# Patient Record
Sex: FEMALE | Race: WHITE
[De-identification: names, ages, dates, MRNs, and addresses within clinical notes are randomized per-mention and may not be internally consistent; named-entity substitution may affect disease eponyms.]

---

## 2023-07-28 ENCOUNTER — HOSPITAL ENCOUNTER (EMERGENCY)
Dept: HOSPITAL 47 - EC | Age: 53
Discharge: HOME | End: 2023-07-28
Payer: COMMERCIAL

## 2023-07-28 VITALS
RESPIRATION RATE: 16 BRPM | SYSTOLIC BLOOD PRESSURE: 94 MMHG | TEMPERATURE: 97.9 F | DIASTOLIC BLOOD PRESSURE: 66 MMHG | HEART RATE: 87 BPM

## 2023-07-28 DIAGNOSIS — Z88.5: ICD-10-CM

## 2023-07-28 DIAGNOSIS — Z88.0: ICD-10-CM

## 2023-07-28 DIAGNOSIS — Z79.899: ICD-10-CM

## 2023-07-28 DIAGNOSIS — Z87.891: ICD-10-CM

## 2023-07-28 DIAGNOSIS — I25.2: ICD-10-CM

## 2023-07-28 DIAGNOSIS — B95.62: ICD-10-CM

## 2023-07-28 DIAGNOSIS — E78.5: ICD-10-CM

## 2023-07-28 DIAGNOSIS — N76.4: Primary | ICD-10-CM

## 2023-07-28 LAB
ALBUMIN SERPL-MCNC: 4.1 G/DL (ref 3.5–5)
ALP SERPL-CCNC: 76 U/L (ref 38–126)
ALT SERPL-CCNC: 21 U/L (ref 4–34)
ANION GAP SERPL CALC-SCNC: 10 MMOL/L
AST SERPL-CCNC: 30 U/L (ref 14–36)
BASOPHILS # BLD AUTO: 0 K/UL (ref 0–0.2)
BASOPHILS NFR BLD AUTO: 1 %
BUN SERPL-SCNC: 22 MG/DL (ref 7–17)
CALCIUM SPEC-MCNC: 9.8 MG/DL (ref 8.4–10.2)
CHLORIDE SERPL-SCNC: 98 MMOL/L (ref 98–107)
CO2 SERPL-SCNC: 25 MMOL/L (ref 22–30)
EOSINOPHIL # BLD AUTO: 0.2 K/UL (ref 0–0.7)
EOSINOPHIL NFR BLD AUTO: 2 %
ERYTHROCYTE [DISTWIDTH] IN BLOOD BY AUTOMATED COUNT: 4.59 M/UL (ref 3.8–5.4)
ERYTHROCYTE [DISTWIDTH] IN BLOOD: 13 % (ref 11.5–15.5)
GLUCOSE SERPL-MCNC: 98 MG/DL (ref 74–99)
HCT VFR BLD AUTO: 39.2 % (ref 34–46)
HGB BLD-MCNC: 13.3 GM/DL (ref 11.4–16)
LYMPHOCYTES # SPEC AUTO: 0.8 K/UL (ref 1–4.8)
LYMPHOCYTES NFR SPEC AUTO: 9 %
MCH RBC QN AUTO: 28.9 PG (ref 25–35)
MCHC RBC AUTO-ENTMCNC: 33.8 G/DL (ref 31–37)
MCV RBC AUTO: 85.5 FL (ref 80–100)
MONOCYTES # BLD AUTO: 0.4 K/UL (ref 0–1)
MONOCYTES NFR BLD AUTO: 4 %
NEUTROPHILS # BLD AUTO: 7.4 K/UL (ref 1.3–7.7)
NEUTROPHILS NFR BLD AUTO: 82 %
PLATELET # BLD AUTO: 155 K/UL (ref 150–450)
POTASSIUM SERPL-SCNC: 3.9 MMOL/L (ref 3.5–5.1)
PROT SERPL-MCNC: 7 G/DL (ref 6.3–8.2)
SODIUM SERPL-SCNC: 133 MMOL/L (ref 137–145)
WBC # BLD AUTO: 9 K/UL (ref 3.8–10.6)

## 2023-07-28 PROCEDURE — 87205 SMEAR GRAM STAIN: CPT

## 2023-07-28 PROCEDURE — 36415 COLL VENOUS BLD VENIPUNCTURE: CPT

## 2023-07-28 PROCEDURE — 87077 CULTURE AEROBIC IDENTIFY: CPT

## 2023-07-28 PROCEDURE — 99283 EMERGENCY DEPT VISIT LOW MDM: CPT

## 2023-07-28 PROCEDURE — 80053 COMPREHEN METABOLIC PANEL: CPT

## 2023-07-28 PROCEDURE — 85025 COMPLETE CBC W/AUTO DIFF WBC: CPT

## 2023-07-28 PROCEDURE — 87186 SC STD MICRODIL/AGAR DIL: CPT

## 2023-07-28 PROCEDURE — 96374 THER/PROPH/DIAG INJ IV PUSH: CPT

## 2023-07-28 PROCEDURE — 56405 I&D VULVA/PERINEAL ABSCESS: CPT

## 2023-07-28 PROCEDURE — 87070 CULTURE OTHR SPECIMN AEROBIC: CPT

## 2023-07-28 NOTE — ED
Female Urogenital HPI





- General


Chief complaint: Urogenital


Stated complaint: Cyst on Buttocks


Time Seen by Provider: 07/28/23 11:34


Source: patient, RN notes reviewed


Mode of arrival: ambulatory


Limitations: no limitations





- History of Present Illness


Initial comments: 


Patient is a 53-year-old  female presenting to the emergency room for 

further evaluation of a labial cyst that has increased induration despite being 

started on Bactrim with a total of 3 doses taken since the medication was 

prescribed. She was reportedly tachycardic and had a low-grade fever at urgent 

care consequently they wanted her to be further evaluated. Upon arrival her 

vital signs were normal; her blood pressure was 94/66 which is baseline for her.

She reports that the labial cyst has been there for approximately 3 days and 

seems to be more painful now than previously. She denies any known history of 

MRSA. With the exception of the pain to the labial region she denies any other 

complaints of the abscess including any fevers, chills, nausea, vomiting, al

tered mental status, dysuria or hematuria. She is a past medical history 

significant for MI, hyperlipidemia and colitis.





- Related Data


                                Home Medications











 Medication  Instructions  Recorded  Confirmed


 


Atorvastatin [Lipitor] 40 mg PO DAILY 07/28/23 07/28/23


 


Dextroamphetamine/Amphetamine 15 mg PO DAILY 07/28/23 07/28/23





[Dextroamphetamine/Amphetamine ER   





15 mg Cap]   


 


Losartan-Hctz 50-12.5 mg [Hyzaar 1 tab PO DAILY 07/28/23 07/28/23





50-12.5]   


 


Pantoprazole Sodium 40 mg PO HS 07/28/23 07/28/23


 


traMADol HCl [Ultram] 50 mg PO Q6HR PRN 07/28/23 07/28/23








                                  Previous Rx's











 Medication  Instructions  Recorded


 


clindamycin HCL [Cleocin] 300 mg PO Q6HR 10 Days #40 cap 07/28/23











                                    Allergies











Allergy/AdvReac Type Severity Reaction Status Date / Time


 


Penicillins Allergy  Rash/Hives Verified 07/28/23 14:04


 


morphine AdvReac  Nausea & Verified 07/28/23 14:04





   Vomiting  














Review of Systems


ROS Statement: 


Those systems with pertinent positive or pertinent negative responses have been 

documented in the HPI.





ROS Other: All systems not noted in ROS Statement are negative.





Past Medical History


Past Medical History: Hyperlipidemia, Myocardial Infarction (MI)


Additional Past Medical History / Comment(s): colitis


History of Any Multi-Drug Resistant Organisms: None Reported


Past Surgical History: Orthopedic Surgery, Tubal Ligation


Additional Past Surgical History / Comment(s): triple bypass, R arm, B shoulder,

 R hand, L hand


Past Psychological History: No Psychological Hx Reported


Smoking Status: Former smoker


Past Alcohol Use History: None Reported


Past Drug Use History: None Reported





General Exam


Limitations: no limitations


General appearance: alert, in no apparent distress


Head exam: Present: atraumatic, normocephalic, normal inspection


Eye exam: Present: normal appearance, PERRL, EOMI.  Absent: scleral icterus, 

conjunctival injection, periorbital swelling


ENT exam: Present: normal exam, mucous membranes moist


Neck exam: Present: normal inspection, full ROM


Respiratory exam: Present: normal lung sounds bilaterally.  Absent: respiratory 

distress, wheezes, rales, rhonchi, stridor


Cardiovascular Exam: Present: regular rate, normal rhythm, normal heart sounds. 

 Absent: systolic murmur, diastolic murmur, rubs, gallop, clicks


GI/Abdominal exam: Present: soft, normal bowel sounds.  Absent: distended, 

tenderness, guarding, rebound, rigid


External exam: Present: erythema, lesions (Abscess to right labia proximal 

portion approximately 2.5 cm in diameter status post incision and drainage 

tolerated well.)


Extremities exam: Present: normal inspection.  Absent: pedal edema, joint 

swelling


Back exam: Present: normal inspection, full ROM


Neurological exam: Present: alert, oriented X3, CN II-XII intact


Psychiatric exam: Present: normal affect, normal mood


Skin exam: Present: other (Abscess as above.)





Course


                                   Vital Signs











  07/28/23





  11:19


 


Temperature 97.9 F


 


Pulse Rate 87


 


Respiratory 16





Rate 


 


Blood Pressure 94/66


 


O2 Sat by Pulse 99





Oximetry 














Procedures





- Incision & Drainage


Consent Obtained: written consent


Site: vulva/vagina (right labia)


Anesthetic Used: lidocaine 1%


I&D Cleaning Method: Chloroprep


Scalpel Used: #11


I&D Drainage Obtained: Pus, Blood


Culture Obtained?: Yes


Patient Tolerated Procedure: well, no complications





Medical Decision Making





- Medical Decision Making


Was pt. sent in by a medical professional or institution (, PA, NP, urgent 

care, hospital, or nursing home...) When possible be specific


@  -Yes, sent from urgent care for further evaluation of labial abscess


Did you speak to anyone other than the patient for history (EMS, parent, family,

 police, friend...)? What history was obtained from this source 


@  -No


Did you review nursing and triage notes (agree or disagree)?  Why? 


@  -I reviewed and agree with nursing and triage notes


Were old charts reviewed (outside hosp., previous admission, EMS record, old 

EKG, old radiological studies, urgent care reports/EKG's, nursing home records)?

 Report findings 


@  -No old charts were reviewed


Differential Diagnosis (chest pain, altered mental status, abdominal pain women,

 abdominal pain men, vaginal bleeding, weakness, fever, dyspnea, syncope, 

headache, dizziness, GI bleed, back pain, seizure, CVA, palpatations, mental 

health, musculoskeletal)? 


@  -not applicable


EKG interpreted by me (3pts min.).


@  -None done


X-rays interpreted by me (1pt min.).


@  -None done


CT interpreted by me (1pt min.).


@  -None done


U/S interpreted by me (1pt. min.).


@  -None done


What testing was considered but not performed or refused? (CT, X-rays, U/S, 

labs)? Why?


@  -None


What meds were considered but not given or refused? Why?


@  -None


Did you discuss the management of the patient with other professionals 

(professionals i.e. , PA, NP, lab, RT, psych nurse, , , 

teacher, , )? Give summary


@  -No


Was smoking cessation discussed for >3mins.?


@  -No


Was critical care preformed (if so, how long)?


@  -No


Were there social determinants of health that impacted care today? How? 

(Homelessness, low income, unemployed, alcoholism, drug addiction, 

transportation, low edu. Level, literacy, decrease access to med. care, care home, 

rehab)?


@  -No


Was there de-escalation of care discussed even if they declined (Discuss DNR or 

withdrawal of care, Hospice)? DNR status


@  -No


What co-morbidities impacted this encounter? (DM, HTN, Smoking, COPD, CAD, 

Cancer, CVA, ARF, Chemo, Hep., AIDS, mental health diagnosis, sleep apnea, 

morbid obesity)?


@  -None


Was patient admitted / discharged? Hospital course, mention meds given and 

route, prescriptions, significant lab abnormalities, going to OR and other 

pertinent info.


@  -53-year-old  female presenting with labial abscess persisting 

despite oral antibiotic therapy prescribed by urgent care. No systemic symptoms 

at the time of examination however due to reported fever and tachycardia at urg

ent care will obtain laboratory studies of CBC and CMP. Will give morphine prior

 to I&D of labial abscess.





Pain improved with morphine. CBC with low lymphocytes at 0.8 otherwise no 

abnormalities on CBC. CMP demonstrates mild dehydration with possible chronic 

kidney disease will no previous renal function testing available at this time. 

BUN 22, creatinine 1.6, sodium low at 133 remaining electrolytes normal. Liver 

enzymes and alkaline phosphatase normal. Patient tolerated incision and drainage

 of abscess with culture obtained. Will broaden coverage for abscess to 

clindamycin. Advised to keep area clean and dry and maintain dressing until 

drainage has subsided. Questions and concerns answered. Return parameters the 

emergency room discussed.





Will discharge home in stable condition on clindamycin to treat labial abscess 

status post incision and draining advising to follow-up with primary care 

provider.


Undiagnosed new problem with uncertain prognosis?


@  -No


Drug Therapy requiring intensive monitoring for toxicity (Heparin, Nitro, 

Insulin, Cardizem)?


@  -No


Were any procedures done?


@  -No


Diagnosis/symptom?


@  -Labial abscess


Acute, or Chronic, or Acute on Chronic?


@  -Acute


Uncomplicated (without systemic symptoms) or Complicated (systemic symptoms)?


@  -Uncomplicated


Side effects of treatment?


@  -No


Exacerbation, Progression, or Severe Exacerbation?


@  -No


Poses a threat to life or bodily function? How? (Chest pain, USA, MI, pneumonia,

 PE, COPD, DKA, ARF, appy, cholecystitis, CVA, Diverticulitis, Homicidal, 

Suicidal, threat to staff... and all critical care pts)


@  -No


Case discussed with Dr. Nava.





- Lab Data


Result diagrams: 


                                 07/28/23 12:45





                                 07/28/23 12:48


                                   Lab Results











  07/28/23 07/28/23 Range/Units





  12:45 12:48 


 


WBC  9.0   (3.8-10.6)  k/uL


 


RBC  4.59   (3.80-5.40)  m/uL


 


Hgb  13.3   (11.4-16.0)  gm/dL


 


Hct  39.2   (34.0-46.0)  %


 


MCV  85.5   (80.0-100.0)  fL


 


MCH  28.9   (25.0-35.0)  pg


 


MCHC  33.8   (31.0-37.0)  g/dL


 


RDW  13.0   (11.5-15.5)  %


 


Plt Count  155   (150-450)  k/uL


 


MPV  7.6   


 


Neutrophils %  82   %


 


Lymphocytes %  9   %


 


Monocytes %  4   %


 


Eosinophils %  2   %


 


Basophils %  1   %


 


Neutrophils #  7.4   (1.3-7.7)  k/uL


 


Lymphocytes #  0.8 L   (1.0-4.8)  k/uL


 


Monocytes #  0.4   (0-1.0)  k/uL


 


Eosinophils #  0.2   (0-0.7)  k/uL


 


Basophils #  0.0   (0-0.2)  k/uL


 


Sodium   133 L  (137-145)  mmol/L


 


Potassium   3.9  (3.5-5.1)  mmol/L


 


Chloride   98  ()  mmol/L


 


Carbon Dioxide   25  (22-30)  mmol/L


 


Anion Gap   10  mmol/L


 


BUN   22 H  (7-17)  mg/dL


 


Creatinine   1.63 H  (0.52-1.04)  mg/dL


 


Est GFR (CKD-EPI)AfAm   41  (>60 ml/min/1.73 sqM)  


 


Est GFR (CKD-EPI)NonAf   36  (>60 ml/min/1.73 sqM)  


 


Glucose   98  (74-99)  mg/dL


 


Calcium   9.8  (8.4-10.2)  mg/dL


 


Total Bilirubin   1.0  (0.2-1.3)  mg/dL


 


AST   30  (14-36)  U/L


 


ALT   21  (4-34)  U/L


 


Alkaline Phosphatase   76  ()  U/L


 


Total Protein   7.0  (6.3-8.2)  g/dL


 


Albumin   4.1  (3.5-5.0)  g/dL














Disposition


Clinical Impression: 


 Labial abscess





Disposition: HOME SELF-CARE


Condition: Stable


Instructions (If sedation given, give patient instructions):  Abscess Incision 

and Drainage (ED)


Additional Instructions: 


Please complete course of clindamycin as prescribed. Keep perineal area clean 

and dry. Utilize peripads to hold dressing in place to labial abscess. Please 

follow-up with your primary care provider. Please return to the Emergency D

epartment if symptoms worsen or any other concerns.


Prescriptions: 


clindamycin HCL [Cleocin] 300 mg PO Q6HR 10 Days #40 cap


Is patient prescribed a controlled substance at d/c from ED?: No


Referrals: 


Da Smith MD [Primary Care Provider] - 1-2 days


Time of Disposition: 14:04